# Patient Record
Sex: FEMALE | Race: WHITE | NOT HISPANIC OR LATINO | Employment: OTHER | URBAN - METROPOLITAN AREA
[De-identification: names, ages, dates, MRNs, and addresses within clinical notes are randomized per-mention and may not be internally consistent; named-entity substitution may affect disease eponyms.]

---

## 2021-05-10 ENCOUNTER — TRANSFERRED RECORDS (OUTPATIENT)
Dept: OTOLARYNGOLOGY | Facility: CLINIC | Age: 86
End: 2021-05-10

## 2022-07-18 ENCOUNTER — OFFICE VISIT (OUTPATIENT)
Dept: OPHTHALMOLOGY | Facility: CLINIC | Age: 87
End: 2022-07-18
Payer: MEDICARE

## 2022-07-18 DIAGNOSIS — H04.123 DRY EYE SYNDROME OF BOTH EYES: ICD-10-CM

## 2022-07-18 DIAGNOSIS — H52.13 MYOPIA OF BOTH EYES WITH ASTIGMATISM AND PRESBYOPIA: ICD-10-CM

## 2022-07-18 DIAGNOSIS — H02.831 DERMATOCHALASIS OF BOTH UPPER EYELIDS: ICD-10-CM

## 2022-07-18 DIAGNOSIS — R68.89 SUSPECTED GLAUCOMA OF BOTH EYES: ICD-10-CM

## 2022-07-18 DIAGNOSIS — H52.203 MYOPIA OF BOTH EYES WITH ASTIGMATISM AND PRESBYOPIA: ICD-10-CM

## 2022-07-18 DIAGNOSIS — H52.4 MYOPIA OF BOTH EYES WITH ASTIGMATISM AND PRESBYOPIA: ICD-10-CM

## 2022-07-18 DIAGNOSIS — Z96.1 PSEUDOPHAKIA OF BOTH EYES: ICD-10-CM

## 2022-07-18 DIAGNOSIS — H02.834 DERMATOCHALASIS OF BOTH UPPER EYELIDS: ICD-10-CM

## 2022-07-18 DIAGNOSIS — H35.61 RETINAL HEMORRHAGE OF RIGHT EYE: Primary | ICD-10-CM

## 2022-07-18 PROCEDURE — 92134 CPTRZ OPH DX IMG PST SGM RTA: CPT | Performed by: OPHTHALMOLOGY

## 2022-07-18 PROCEDURE — 99203 OFFICE O/P NEW LOW 30 MIN: CPT | Performed by: OPHTHALMOLOGY

## 2022-07-18 RX ORDER — OMEPRAZOLE 40 MG/1
40 CAPSULE, DELAYED RELEASE ORAL DAILY
COMMUNITY

## 2022-07-18 RX ORDER — ASPIRIN 81 MG/1
81 TABLET ORAL DAILY
COMMUNITY

## 2022-07-18 ASSESSMENT — CUP TO DISC RATIO
OD_RATIO: 0
OS_RATIO: 0.8

## 2022-07-18 ASSESSMENT — CONF VISUAL FIELD
OD_NORMAL: 1
OS_INFERIOR_TEMPORAL_RESTRICTION: 2
OS_SUPERIOR_TEMPORAL_RESTRICTION: 2
OS_SUPERIOR_NASAL_RESTRICTION: 2
OS_INFERIOR_NASAL_RESTRICTION: 2

## 2022-07-18 ASSESSMENT — REFRACTION_WEARINGRX
OS_AXIS: 078
OS_SPHERE: -2.25
OD_SPHERE: -0.50
OS_CYLINDER: +0.50
OD_AXIS: 043
OS_ADD: +3.00
OD_CYLINDER: +0.50
OD_ADD: +3.00

## 2022-07-18 ASSESSMENT — VISUAL ACUITY
OS_CC: 20/30
CORRECTION_TYPE: GLASSES
OD_CC: HM
OS_CC: J2
METHOD: SNELLEN - LINEAR

## 2022-07-18 ASSESSMENT — TONOMETRY
IOP_METHOD: APPLANATION
OS_IOP_MMHG: 14
OD_IOP_MMHG: 15

## 2022-07-18 ASSESSMENT — SLIT LAMP EXAM - LIDS
COMMENTS: 2+ DERMATOCHALASIS
COMMENTS: 2+ DERMATOCHALASIS

## 2022-07-18 NOTE — PROGRESS NOTES
HPI     Decreased Vision Evaluation     In right eye.  This started 8 months ago.  Presenting in central vision.  Charactertized as  blurred vision and fluctuating.  It is worse throughout the day.              Comments     She notes that about 6-8 months ago her right eye started getting blurry. It has continued to get worse. She states the left eye has double vision. This has been going on for many years. She has prism in her glasses. When she has her glasses on, there is no double vision. Her glasses are 1 year old.    She had cataract surgery on both eyes in 2015 with Dr. Carty.     denies family history of ocular conditions     JAYDEN 2 years ago          Last edited by Jaylen Gonzales MD on 7/18/2022 11:28 AM. (History)         Review of systems for the eyes was negative other than the pertinent positives/negatives listed in the HPI.      Assessment & Plan    HPI:  Elidia Sigala is a 89 year old female with history of brain stem CVA, vertigo, neuropathy, pseudophakia, myopia with astigmatism and presbyopia presents with 1 year of progressive vision loss in her right eye.     POHx: HERMANN Carty, binocular diplopia  PMHx:  Current Medications: aspirin 81 MG EC tablet, Take 81 mg by mouth daily  omeprazole (PRILOSEC) 40 MG DR capsule, Take 40 mg by mouth daily    No current facility-administered medications on file prior to visit.    PSHx:HERMANN Carty [facility] 2015      Current Eye Medications:  None    Assessment & Plan:  (H35.61) Retinal hemorrhage of right eye  (primary encounter diagnosis)  Preretinal and subretinal heme noted on exam  OCT demonstrates significant IRF and mild subretinal fluid  Differential includes Branch retinal vein occlusion, ruptured retinal arterial macroaneurysm, CNV   Will refer to retina for likely FA and anti-VEGF injection  No prior history of Diabetes mellitus or Age related macular degeneration, no drusen noted contralateral eye    (Z96.1) Pseudophakia of both  eyes  [facility] 2015 [facility]    (H52.13,  H52.203,  H52.4) Myopia of both eyes with astigmatism and presbyopia  Doing well in current MRx    (H04.123) Dry eye syndrome of both eyes  Start artificial tears four times daily (best used before reading, using a computer or watching TV)    Glaucoma Suspect  Needs CEE with and complete glaucoma workup at some point in the future    Diplopia  Resolves with prism  Continue prism glasses    Return for refer to retina for FA and likely Avastin-LEYLA vs BRVO vs CNV.        Jaylen Gonzales MD     Attending Physician Attestation:  Complete documentation of historical and exam elements from today's encounter can be found in the full encounter summary report (not reduplicated in this progress note).  I personally obtained the chief complaint(s) and history of present illness.  I confirmed and edited as necessary the review of systems, past medical/surgical history, family history, social history, and examination findings as documented by others; and I examined the patient myself.  I personally reviewed the relevant tests, images, and reports as documented above.  I formulated and edited as necessary the assessment and plan and discussed the findings and management plan with the patient and family. - Jaylen Gonzales MD

## 2022-07-18 NOTE — NURSING NOTE
"Chief Complaints and History of Present Illnesses   Patient presents with     Decreased Vision Evaluation       Chief Complaint(s) and History of Present Illness(es)     Decreased Vision Evaluation     Laterality: right eye    Onset: 8 months ago    Location: central vision    Quality: blurred vision and fluctuating    Timing: throughout the day              Comments     She notes that about 6-8 months ago her right eye started getting blurry. It has continued to get worse. She describes it as being\"wilmar\", like looking through a curtain. Vision keeps getting darker.   She states the left eye has double vision. This has been going on for many years. She has prism in her glasses. When she has her glasses on, there is no double vision. Her glasses are 1 year old.   She had cataract surgery on both eyes in 2015.                JIM Ellis  10:41 AM 07/18/2022    "

## 2022-07-19 ENCOUNTER — TELEPHONE (OUTPATIENT)
Dept: OPHTHALMOLOGY | Facility: CLINIC | Age: 87
End: 2022-07-19

## 2022-07-19 NOTE — TELEPHONE ENCOUNTER
Called Bhumika (daughter-in-law) to schedule an appt with a retina provider here at the  this week, per Dr. Gonzales's request.  Bhumika stated the pt's daughters changed their minds and were going to set up something elsewhere as they aren't comfortable driving in the [facility].    Marietta Kolb, COA 1:05 PM July 19, 2022

## 2022-08-01 ENCOUNTER — TRANSFERRED RECORDS (OUTPATIENT)
Dept: HEALTH INFORMATION MANAGEMENT | Facility: CLINIC | Age: 87
End: 2022-08-01

## 2023-04-10 ENCOUNTER — TRANSFERRED RECORDS (OUTPATIENT)
Dept: HEALTH INFORMATION MANAGEMENT | Facility: CLINIC | Age: 88
End: 2023-04-10
Payer: MEDICARE

## 2023-06-02 ENCOUNTER — OFFICE VISIT (OUTPATIENT)
Dept: OPHTHALMOLOGY | Facility: CLINIC | Age: 88
End: 2023-06-02
Payer: MEDICARE

## 2023-06-02 DIAGNOSIS — Z96.1 PSEUDOPHAKIA OF BOTH EYES: ICD-10-CM

## 2023-06-02 DIAGNOSIS — H52.4 MYOPIA OF BOTH EYES WITH ASTIGMATISM AND PRESBYOPIA: ICD-10-CM

## 2023-06-02 DIAGNOSIS — H52.13 MYOPIA OF BOTH EYES WITH ASTIGMATISM AND PRESBYOPIA: ICD-10-CM

## 2023-06-02 DIAGNOSIS — H35.30 ARMD (AGE-RELATED MACULAR DEGENERATION), BILATERAL: ICD-10-CM

## 2023-06-02 DIAGNOSIS — H52.203 MYOPIA OF BOTH EYES WITH ASTIGMATISM AND PRESBYOPIA: ICD-10-CM

## 2023-06-02 DIAGNOSIS — H04.123 DRY EYE SYNDROME OF BOTH EYES: ICD-10-CM

## 2023-06-02 DIAGNOSIS — H35.61 RETINAL HEMORRHAGE OF RIGHT EYE: Primary | ICD-10-CM

## 2023-06-02 PROCEDURE — 92014 COMPRE OPH EXAM EST PT 1/>: CPT | Performed by: OPHTHALMOLOGY

## 2023-06-02 PROCEDURE — 92015 DETERMINE REFRACTIVE STATE: CPT | Performed by: OPHTHALMOLOGY

## 2023-06-02 RX ORDER — CALCIUM CARBONATE/VITAMIN D3 600 MG-10
1000 TABLET ORAL DAILY
COMMUNITY

## 2023-06-02 RX ORDER — LEVOTHYROXINE SODIUM 75 UG/1
1 TABLET ORAL
COMMUNITY
Start: 2023-05-22

## 2023-06-02 RX ORDER — TORSEMIDE 5 MG/1
1 TABLET ORAL
COMMUNITY
Start: 2023-04-19

## 2023-06-02 ASSESSMENT — VISUAL ACUITY
METHOD: SNELLEN - LINEAR
OD_CC: CF
CORRECTION_TYPE: GLASSES
OS_CC: 20/25

## 2023-06-02 ASSESSMENT — REFRACTION_WEARINGRX
OD_ADD: +3.00
OD_SPHERE: -0.50
OS_AXIS: 078
OS_ADD: +3.00
OD_AXIS: 043
OS_VPRISM: 3BU
OD_HPRISM: 2BO
OD_CYLINDER: +0.50
OS_CYLINDER: +0.50
OS_SPHERE: -2.25
OD_VPRISM: 3BD
OS_HPRISM: 2BO

## 2023-06-02 ASSESSMENT — CUP TO DISC RATIO
OS_RATIO: 0.8
OD_RATIO: 0.8

## 2023-06-02 ASSESSMENT — REFRACTION_FINALRX
OS_HPRISM: 2BO
OS_VPRISM: 3BU
OD_HPRISM: 2BO
OS_HPRISM: 2BO
OS_VPRISM: 3BU
OD_HPRISM: 2BO
OD_VPRISM: 3BD
OD_VPRISM: 3BD

## 2023-06-02 ASSESSMENT — CONF VISUAL FIELD
OD_INFERIOR_TEMPORAL_RESTRICTION: 2
OD_SUPERIOR_NASAL_RESTRICTION: 2
OS_INFERIOR_NASAL_RESTRICTION: 0
OS_SUPERIOR_NASAL_RESTRICTION: 0
OD_SUPERIOR_TEMPORAL_RESTRICTION: 2
OS_INFERIOR_TEMPORAL_RESTRICTION: 0
OD_INFERIOR_NASAL_RESTRICTION: 2
OS_NORMAL: 1
OS_SUPERIOR_TEMPORAL_RESTRICTION: 0

## 2023-06-02 ASSESSMENT — TONOMETRY
OS_IOP_MMHG: 10
OD_IOP_MMHG: 14
IOP_METHOD: ICARE

## 2023-06-02 ASSESSMENT — SLIT LAMP EXAM - LIDS
COMMENTS: 2+ DERMATOCHALASIS
COMMENTS: 2+ DERMATOCHALASIS

## 2023-06-02 ASSESSMENT — REFRACTION_MANIFEST
OD_CYLINDER: +0.50
OS_ADD: +3.00
OS_SPHERE: -2.00
OS_AXIS: 078
OD_AXIS: 043
OS_CYLINDER: +0.50
OD_SPHERE: -0.50
OD_ADD: +3.00

## 2023-06-02 NOTE — NURSING NOTE
Chief Complaints and History of Present Illnesses   Patient presents with     Annual Eye Exam       Chief Complaint(s) and History of Present Illness(es)     Annual Eye Exam            Laterality: both eyes          Comments    Patient returning for annual eye exam.   Patient has been getting injections with Dr. Laird at [facility], last injection was 05/22/23 (Pt states at this appointment he changed up the injection to something different).  Patient feel she has lost some depth perception and feels that her vision is blurry. Is wondering if updating an RX is worth it or not.   No pain,No flashes, No floaters.   Right eye continues to have lacy appearance over it and distorted vision when her glasses are off.                  Kenneth Strauss, Ophthalmic Assistant    regular rate and rhythm

## 2023-06-02 NOTE — PROGRESS NOTES
HPI     Annual Eye Exam    In both eyes.           Comments    Patient returning for annual eye exam.   Patient has been getting injections with Dr. Laird at [facility], last injection was 05/22/23 (Pt states at this appointment he changed up the injection to something different).  Patient feel she has lost some depth perception and feels that her vision is blurry. Is wondering if updating an RX is worth it or not.   No pain,No flashes, No floaters.   Right eye continues to have lacy appearance over it and distorted vision when her glasses are off.           Last edited by Kenneth Strauss on 6/2/2023  2:47 PM.         Review of systems for the eyes was negative other than the pertinent positives/negatives listed in the HPI.      Assessment & Plan    HPI:  Elidia Sigala is a 89 year old female with history of brain stem CVA, vertigo, neuropathy, pseudophakia, myopia with astigmatism and presbyopia presents for annual exam. Had large heme right eye and has been receiving injections from [facility]Dr Alexey Laird. Received 8 Eylea injections as of 4/10/23    POHx: HERMANN Carty, binocular diplopia  PMHx:  Current Medications: aspirin 81 MG EC tablet, Take 81 mg by mouth daily  cholecalciferol 50 MCG (2000 UT) CAPS, Take 2,000 Units by mouth  levothyroxine (SYNTHROID/LEVOTHROID) 75 MCG tablet, Take 1 tablet by mouth daily at 2 pm  omeprazole (PRILOSEC) 40 MG DR capsule, Take 40 mg by mouth daily  torsemide (DEMADEX) 5 MG tablet, Take 1 tablet by mouth daily at 2 pm  vitamin B-12 (CYANOCOBALAMIN) 250 MCG tablet, Take 1,000 mcg by mouth daily    No current facility-administered medications on file prior to visit.    PSHx:HERMANN [facility] 2015      Current Eye Medications:  None    Assessment & Plan:  (H35.61) Retinal hemorrhage of right eye  (primary encounter diagnosis)  (H35.30) ARMD (age-related macular degeneration), bilateral  Preretinal and subretinal heme noted on exam  Branch retinal vein  occlusion vs Age related macular degeneration right eye, receiving injections from Alexey Laird  left eye with heme today  Encouraged AREDS2 and to see RCM sooner than 4 weeks from now to consider injections left eye     (Z96.1) Pseudophakia of both eyes  [facility] 2015 [facility]    (H52.13,  H52.203,  H52.4) Myopia of both eyes with astigmatism and presbyopia  Doing well in current MRx    (H04.123) Dry eye syndrome of both eyes  Start artificial tears four times daily (best used before reading, using a computer or watching TV)    Diplopia  20/200 vision right eye  Prism dispensed  Advised single vision readers and distance glasses  Biggest issue is diplopia with near, likely prismatic effect from bifocal and prism    Return in about 3 months (around 9/2/2023) for v/t glasses check.        Jaylen Gonzales MD     Attending Physician Attestation:  Complete documentation of historical and exam elements from today's encounter can be found in the full encounter summary report (not reduplicated in this progress note).  I personally obtained the chief complaint(s) and history of present illness.  I confirmed and edited as necessary the review of systems, past medical/surgical history, family history, social history, and examination findings as documented by others; and I examined the patient myself.  I personally reviewed the relevant tests, images, and reports as documented above.  I formulated and edited as necessary the assessment and plan and discussed the findings and management plan with the patient and family. - Jaylen Gonzales MD

## 2023-09-01 ENCOUNTER — OFFICE VISIT (OUTPATIENT)
Dept: OPHTHALMOLOGY | Facility: CLINIC | Age: 88
End: 2023-09-01
Payer: MEDICARE

## 2023-09-01 DIAGNOSIS — H53.2 DIPLOPIA: ICD-10-CM

## 2023-09-01 DIAGNOSIS — H52.203 MYOPIA OF BOTH EYES WITH ASTIGMATISM AND PRESBYOPIA: Primary | ICD-10-CM

## 2023-09-01 DIAGNOSIS — H52.4 MYOPIA OF BOTH EYES WITH ASTIGMATISM AND PRESBYOPIA: Primary | ICD-10-CM

## 2023-09-01 DIAGNOSIS — H52.13 MYOPIA OF BOTH EYES WITH ASTIGMATISM AND PRESBYOPIA: Primary | ICD-10-CM

## 2023-09-01 PROCEDURE — 99212 OFFICE O/P EST SF 10 MIN: CPT | Performed by: OPHTHALMOLOGY

## 2023-09-01 ASSESSMENT — CONF VISUAL FIELD
OD_INFERIOR_TEMPORAL_RESTRICTION: 2
OS_SUPERIOR_NASAL_RESTRICTION: 0
OD_SUPERIOR_NASAL_RESTRICTION: 2
OD_INFERIOR_NASAL_RESTRICTION: 2
OS_SUPERIOR_TEMPORAL_RESTRICTION: 0
OS_INFERIOR_NASAL_RESTRICTION: 0
OS_INFERIOR_TEMPORAL_RESTRICTION: 0
OD_SUPERIOR_TEMPORAL_RESTRICTION: 2
OS_NORMAL: 1

## 2023-09-01 ASSESSMENT — REFRACTION_WEARINGRX
OD_AXIS: 043
OD_CYLINDER: +0.50
OD_ADD: +3.00
OD_SPHERE: -0.50
OS_ADD: +3.00
OS_HPRISM: 2BO
OS_AXIS: 078
OD_HPRISM: 2BO
OS_CYLINDER: +0.50
OS_VPRISM: 3BU
OS_SPHERE: -2.25
OD_VPRISM: 3BD

## 2023-09-01 ASSESSMENT — REFRACTION_MANIFEST
OS_CYLINDER: +0.75
OS_AXIS: 078
OD_ADD: +3.00
OD_SPHERE: -0.50
OS_SPHERE: -2.00
OD_AXIS: 043
OS_ADD: +3.00
OD_CYLINDER: +0.50

## 2023-09-01 ASSESSMENT — VISUAL ACUITY
OS_CC: 20/20
CORRECTION_TYPE: GLASSES
OD_CC: CF
METHOD: SNELLEN - LINEAR
OS_CC+: -2

## 2023-09-01 ASSESSMENT — SLIT LAMP EXAM - LIDS
COMMENTS: 2+ DERMATOCHALASIS, LOWER LID LAXITY
COMMENTS: 2+ DERMATOCHALASIS, LOWER LID LAXITY

## 2023-09-01 ASSESSMENT — TONOMETRY
OS_IOP_MMHG: 11
IOP_METHOD: ICARE
OD_IOP_MMHG: 10

## 2023-09-01 NOTE — NURSING NOTE
"Chief Complaints and History of Present Illnesses   Patient presents with    Follow Up     Glasses Check.        Chief Complaint(s) and History of Present Illness(es)       Follow Up              Laterality: both eyes    Comments: Glasses Check.               Comments    Patient returning for 3 month follow up and RX check.   Patient is continuing to wear trifocal glasses.   She has more questions about the recommendation to switch to SV lenses and alternate distance and near, feels this will not be practical.  Questions about switching to progressive lenses or bifocal instead of current trifocal.   Had retinal injection on 08/21/23 w/ Dr. Laird. Getting more \"crazy lines\" in her vision instead of the lace pattern previously.   No eye drops.                              Kenneth Strauss, Ophthalmic Assistant    "

## 2023-09-01 NOTE — PROGRESS NOTES
"HPI       Follow Up    In both eyes. Additional comments: Glasses Check.              Comments    Patient returning for 3 month follow up and RX check.   Patient is continuing to wear trifocal glasses.   She has more questions about the recommendation to switch to SV lenses and alternate distance and near, feels this will not be practical.  Questions about switching to progressive lenses or bifocal instead of current trifocal.   Had retinal injection on 08/21/23 w/ Dr. Laird. Getting more \"crazy lines\" in her vision instead of the lace pattern previously.   No eye drops.                     Last edited by Kenneth Strauss on 9/1/2023  1:18 PM.         Review of systems for the eyes was negative other than the pertinent positives/negatives listed in the HPI.      Assessment & Plan    HPI:  Elidia Sigala is a 89 year old female with history of brain stem CVA, vertigo, neuropathy, pseudophakia, myopia with astigmatism and presbyopia presents for followup glasses check. She did not get new glasses since her last visit.       POHx: HERMANN Carty, binocular diplopia  PMHx: hypothyroid, GERD  Current Medications: aspirin 81 MG EC tablet, Take 81 mg by mouth daily  cholecalciferol 50 MCG (2000 UT) CAPS, Take 2,000 Units by mouth  levothyroxine (SYNTHROID/LEVOTHROID) 75 MCG tablet, Take 1 tablet by mouth daily at 2 pm  omeprazole (PRILOSEC) 40 MG DR capsule, Take 40 mg by mouth daily  torsemide (DEMADEX) 5 MG tablet, Take 1 tablet by mouth daily at 2 pm  vitamin B-12 (CYANOCOBALAMIN) 250 MCG tablet, Take 1,000 mcg by mouth daily    No current facility-administered medications on file prior to visit.    PSHx:HERMANN [facility]      Current Eye Medications:  None    Assessment & Plan:  (H52.13,  H52.203,  H52.4) Myopia of both eyes with astigmatism and presbyopia  Diplopia  20/200 vision right eye  Prism dispensed (previously used x 20 years)  Advised to fill MRx for monocular precautions  Return at any time " for to check for diplopia or prism change      (H35.61) Retinal hemorrhage of right eye  (primary encounter diagnosis)  (H35.30) ARMD (age-related macular degeneration), bilateral  Preretinal and subretinal heme noted on exam  Branch retinal vein occlusion vs Age related macular degeneration right eye, receiving injections from Alexey Laird  left eye with ?heme previously, not confirmed by retina, no injections left eye     (Z96.1) Pseudophakia of both eyes  [facility] 2015 [facility]    (H04.123) Dry eye syndrome of both eyes  artificial tears four times daily (best used before reading, using a computer or watching TV)      Return in about 1 year (around 9/1/2024) for Annual Visit-v/t/d/MRx.        Jaylen Gonzales MD     Attending Physician Attestation:  Complete documentation of historical and exam elements from today's encounter can be found in the full encounter summary report (not reduplicated in this progress note).  I personally obtained the chief complaint(s) and history of present illness.  I confirmed and edited as necessary the review of systems, past medical/surgical history, family history, social history, and examination findings as documented by others; and I examined the patient myself.  I personally reviewed the relevant tests, images, and reports as documented above.  I formulated and edited as necessary the assessment and plan and discussed the findings and management plan with the patient and family. - Jaylen Gonzales MD

## 2023-09-05 ENCOUNTER — TELEPHONE (OUTPATIENT)
Dept: OPHTHALMOLOGY | Facility: CLINIC | Age: 88
End: 2023-09-05
Payer: MEDICARE

## 2023-09-05 NOTE — TELEPHONE ENCOUNTER
Left Voicemail (1st Attempt) for the patient to call back and schedule the following:    Appointment type: Return  Provider: Dr. Gonzales  Return date: 9/1/2024  Specialty phone number: [phone]  Additonal Notes: Return in about 1 year (around 9/1/2024) for Annual Visit-v/t/d/Jose renteria Procedure   Dermatology, Surgery, Urology  [facility]

## 2023-09-25 ENCOUNTER — TRANSFERRED RECORDS (OUTPATIENT)
Dept: HEALTH INFORMATION MANAGEMENT | Facility: CLINIC | Age: 88
End: 2023-09-25
Payer: MEDICARE

## 2023-11-06 ENCOUNTER — TRANSFERRED RECORDS (OUTPATIENT)
Dept: HEALTH INFORMATION MANAGEMENT | Facility: CLINIC | Age: 88
End: 2023-11-06
Payer: MEDICARE

## 2023-12-18 ENCOUNTER — TRANSFERRED RECORDS (OUTPATIENT)
Dept: HEALTH INFORMATION MANAGEMENT | Facility: CLINIC | Age: 88
End: 2023-12-18
Payer: MEDICARE

## 2024-01-29 ENCOUNTER — TRANSFERRED RECORDS (OUTPATIENT)
Dept: HEALTH INFORMATION MANAGEMENT | Facility: CLINIC | Age: 89
End: 2024-01-29
Payer: MEDICARE

## 2024-03-05 ENCOUNTER — TELEPHONE (OUTPATIENT)
Dept: OPHTHALMOLOGY | Facility: CLINIC | Age: 89
End: 2024-03-05
Payer: MEDICARE

## 2024-03-05 NOTE — TELEPHONE ENCOUNTER
Left Voicemail (2nd Attempt) for the patient to call back and schedule the following:    Appointment type: return  Provider: dr. Gonzales   Return date: 9/1/2024  Specialty phone number: [phone]   Additonal Notes: : Return in about 1 year (around 9/1/2024) for Annual Visit-v/t/d/Jose renteria Complex   Orthopedics, Podiatry, Sports Medicine, Ent ,Eye , Audiology, Adult Endocrine & Diabetes, Nutrition & Medication Therapy Management Specialties   [facility]

## 2024-04-08 ENCOUNTER — TRANSFERRED RECORDS (OUTPATIENT)
Dept: HEALTH INFORMATION MANAGEMENT | Facility: CLINIC | Age: 89
End: 2024-04-08
Payer: MEDICARE

## 2024-07-15 ENCOUNTER — TRANSFERRED RECORDS (OUTPATIENT)
Dept: HEALTH INFORMATION MANAGEMENT | Facility: CLINIC | Age: 89
End: 2024-07-15
Payer: MEDICARE

## 2024-08-19 ENCOUNTER — TRANSFERRED RECORDS (OUTPATIENT)
Dept: HEALTH INFORMATION MANAGEMENT | Facility: CLINIC | Age: 89
End: 2024-08-19
Payer: MEDICARE

## 2024-09-09 ENCOUNTER — OFFICE VISIT (OUTPATIENT)
Dept: OPHTHALMOLOGY | Facility: CLINIC | Age: 89
End: 2024-09-09
Payer: MEDICARE

## 2024-09-09 DIAGNOSIS — H40.003 GLAUCOMA SUSPECT OF BOTH EYES: Primary | ICD-10-CM

## 2024-09-09 DIAGNOSIS — H04.123 DRY EYE SYNDROME OF BOTH EYES: ICD-10-CM

## 2024-09-09 DIAGNOSIS — H35.61 RETINAL HEMORRHAGE OF RIGHT EYE: ICD-10-CM

## 2024-09-09 DIAGNOSIS — H52.4 MYOPIA OF BOTH EYES WITH ASTIGMATISM AND PRESBYOPIA: ICD-10-CM

## 2024-09-09 DIAGNOSIS — Z96.1 PSEUDOPHAKIA OF BOTH EYES: ICD-10-CM

## 2024-09-09 DIAGNOSIS — H52.203 MYOPIA OF BOTH EYES WITH ASTIGMATISM AND PRESBYOPIA: ICD-10-CM

## 2024-09-09 DIAGNOSIS — H52.13 MYOPIA OF BOTH EYES WITH ASTIGMATISM AND PRESBYOPIA: ICD-10-CM

## 2024-09-09 DIAGNOSIS — H35.30 ARMD (AGE-RELATED MACULAR DEGENERATION), BILATERAL: ICD-10-CM

## 2024-09-09 PROCEDURE — 92015 DETERMINE REFRACTIVE STATE: CPT | Performed by: OPHTHALMOLOGY

## 2024-09-09 PROCEDURE — 92014 COMPRE OPH EXAM EST PT 1/>: CPT | Performed by: OPHTHALMOLOGY

## 2024-09-09 PROCEDURE — 92133 CPTRZD OPH DX IMG PST SGM ON: CPT | Performed by: OPHTHALMOLOGY

## 2024-09-09 ASSESSMENT — REFRACTION_WEARINGRX
OD_SPHERE: -0.50
OD_CYLINDER: +0.50
OS_AXIS: 079
OS_CYLINDER: +1.00
OS_ADD: +3.25
OD_AXIS: 046
OD_ADD: +3.25
OS_SPHERE: -2.50

## 2024-09-09 ASSESSMENT — REFRACTION_MANIFEST
OS_ADD: +3.25
OD_ADD: +3.25
OD_CYLINDER: +0.50
OS_CYLINDER: +0.75
OD_AXIS: 043
OS_SPHERE: -2.00
OD_SPHERE: -0.50
OS_AXIS: 104

## 2024-09-09 ASSESSMENT — TONOMETRY
OD_IOP_MMHG: 13
OS_IOP_MMHG: 16
IOP_METHOD: ICARE

## 2024-09-09 ASSESSMENT — CUP TO DISC RATIO
OD_RATIO: 0.8
OS_RATIO: 0.8

## 2024-09-09 ASSESSMENT — CONF VISUAL FIELD
OS_INFERIOR_TEMPORAL_RESTRICTION: 0
OS_SUPERIOR_TEMPORAL_RESTRICTION: 0
OD_SUPERIOR_NASAL_RESTRICTION: 2
OD_SUPERIOR_TEMPORAL_RESTRICTION: 2
OS_SUPERIOR_NASAL_RESTRICTION: 0
OS_INFERIOR_NASAL_RESTRICTION: 0
OD_INFERIOR_TEMPORAL_RESTRICTION: 2
OS_NORMAL: 1
METHOD: COUNTING FINGERS
OD_INFERIOR_NASAL_RESTRICTION: 2

## 2024-09-09 ASSESSMENT — SLIT LAMP EXAM - LIDS
COMMENTS: 2+ DERMATOCHALASIS, LOWER LID LAXITY
COMMENTS: 2+ DERMATOCHALASIS, LOWER LID LAXITY

## 2024-09-09 ASSESSMENT — VISUAL ACUITY
METHOD: SNELLEN - LINEAR
OS_CC+: +3
OD_CC: 20/400
OS_CC: 20/25

## 2024-09-09 NOTE — NURSING NOTE
Chief Complaints and History of Present Illnesses   Patient presents with    Annual Eye Exam       Chief Complaint(s) and History of Present Illness(es)       Annual Eye Exam              Laterality: both eyes              Comments    Patient has noticed a decrease in her vision, especially at near. She continues to get anti-veg F injections in her right eye. Her last one was August 19th, 2024. She also mentions an increase in floaters that come and go. No flashing lights. No ocular pain or discomfort.   Ocular Medications: Ocuvite by mouth two tabs once a day                   Jayna Vincent, COA

## 2024-09-09 NOTE — PROGRESS NOTES
HPI       Annual Eye Exam    In both eyes.             Comments    Patient has noticed a decrease in her vision, especially at near. She continues to get anti-veg F injections in her right eye. Her last one was August 19th, 2024. She also mentions an increase in floaters that come and go. No flashing lights. No ocular pain or discomfort.   Ocular Medications: Ocuvite by mouth two tabs once a day          Last edited by Jayna Vincent on 9/9/2024  1:29 PM.         Review of systems for the eyes was negative other than the pertinent positives/negatives listed in the HPI.      Assessment & Plan    HPI:  Elidia Sigala is a 89 year old female with history of brain stem CVA, vertigo, neuropathy, pseudophakia, Age related macular degeneration, myopia with astigmatism and presbyopia presents for annual exam. Continues to see Dr. Alexey Laird at [facility] for injections right eye q4-5weeks      POHx: HERMANN Carty, binocular diplopia  PMHx: hypothyroid, GERD  Current Medications:   Current Outpatient Medications   Medication Sig Dispense Refill    aspirin 81 MG EC tablet Take 81 mg by mouth daily      cholecalciferol 50 MCG (2000 UT) CAPS Take 2,000 Units by mouth      levothyroxine (SYNTHROID/LEVOTHROID) 75 MCG tablet Take 1 tablet by mouth daily at 2 pm      omeprazole (PRILOSEC) 40 MG DR capsule Take 40 mg by mouth daily      torsemide (DEMADEX) 5 MG tablet Take 1 tablet by mouth daily at 2 pm      vitamin B-12 (CYANOCOBALAMIN) 250 MCG tablet Take 1,000 mcg by mouth daily       No current facility-administered medications for this visit.     PSHx:HERMANN [facility]      Current Eye Medications:  None    Assessment & Plan:  (H52.13,  H52.203,  H52.4) Myopia of both eyes with astigmatism and presbyopia  Diplopia  20/200 vision right eye  Prism dispensed (previously used x 20 years)  Patient has minimal change in myopia but a copy of today's glasses prescription was given.  The patient may wish to update the glasses  if the lenses are scratched or the frames are too small.  Presbyopia is difficulty seeing up close and is treated with bifocals or over the counter reading glasses    (H40.003) Glaucoma suspect of both eyes  (primary encounter diagnosis)  OCT nerve fiber layer 09/09/24:   Right eye - G(r) 72 NI (y) 59 TI (r) 73 NS (y) 69 TS (r) 88      Left eye - G(r) 69 NI (g) 74 TI (g) 119 NS (r) 39 TS (y) 95    IOP within normal limits  Observe off drops  Repeat oct retinal nerve fiber layer 6 months    (H35.30) ARMD (age-related macular degeneration), bilateral  Preretinal and subretinal heme noted on exam  Branch retinal vein occlusion vs Age related macular degeneration right eye, receiving injections from Alexey Laird  left eye with heme superiorly      (Z96.1) Pseudophakia of both eyes  [place]reska 2015 [facility]    (H04.123) Dry eye syndrome of both eyes  artificial tears four times daily (best used before reading, using a computer or watching TV)      Return in about 6 months (around 3/9/2025) for Follow Up-v/t, OCT RNFL.        Jaylen Gonzales MD     Attending Physician Attestation:  Complete documentation of historical and exam elements from today's encounter can be found in the full encounter summary report (not reduplicated in this progress note).  I personally obtained the chief complaint(s) and history of present illness.  I confirmed and edited as necessary the review of systems, past medical/surgical history, family history, social history, and examination findings as documented by others; and I examined the patient myself.  I personally reviewed the relevant tests, images, and reports as documented above.  I formulated and edited as necessary the assessment and plan and discussed the findings and management plan with the patient and family. - Jaylen Gonzales MD

## 2024-12-02 ENCOUNTER — TRANSFERRED RECORDS (OUTPATIENT)
Dept: HEALTH INFORMATION MANAGEMENT | Facility: CLINIC | Age: 89
End: 2024-12-02
Payer: MEDICARE

## 2025-03-31 ENCOUNTER — OFFICE VISIT (OUTPATIENT)
Dept: OPHTHALMOLOGY | Facility: CLINIC | Age: OVER 89
End: 2025-03-31
Payer: MEDICARE

## 2025-03-31 DIAGNOSIS — H35.30 ARMD (AGE-RELATED MACULAR DEGENERATION), BILATERAL: ICD-10-CM

## 2025-03-31 DIAGNOSIS — H52.13 MYOPIA OF BOTH EYES WITH ASTIGMATISM AND PRESBYOPIA: ICD-10-CM

## 2025-03-31 DIAGNOSIS — H53.2 DIPLOPIA: ICD-10-CM

## 2025-03-31 DIAGNOSIS — H35.61 RETINAL HEMORRHAGE OF RIGHT EYE: ICD-10-CM

## 2025-03-31 DIAGNOSIS — H40.003 GLAUCOMA SUSPECT OF BOTH EYES: Primary | ICD-10-CM

## 2025-03-31 DIAGNOSIS — H04.123 DRY EYE SYNDROME OF BOTH EYES: ICD-10-CM

## 2025-03-31 DIAGNOSIS — H52.203 MYOPIA OF BOTH EYES WITH ASTIGMATISM AND PRESBYOPIA: ICD-10-CM

## 2025-03-31 DIAGNOSIS — H52.4 MYOPIA OF BOTH EYES WITH ASTIGMATISM AND PRESBYOPIA: ICD-10-CM

## 2025-03-31 DIAGNOSIS — Z96.1 PSEUDOPHAKIA OF BOTH EYES: ICD-10-CM

## 2025-03-31 PROCEDURE — 99213 OFFICE O/P EST LOW 20 MIN: CPT | Performed by: OPHTHALMOLOGY

## 2025-03-31 PROCEDURE — 92133 CPTRZD OPH DX IMG PST SGM ON: CPT | Performed by: OPHTHALMOLOGY

## 2025-03-31 PROCEDURE — 92015 DETERMINE REFRACTIVE STATE: CPT | Performed by: OPHTHALMOLOGY

## 2025-03-31 ASSESSMENT — REFRACTION_FINALRX: OS_VPRISM: 3.5BU

## 2025-03-31 ASSESSMENT — REFRACTION_WEARINGRX
OD_CYLINDER: +0.50
OS_SPHERE: -2.50
OS_ADD: +3.25
OD_AXIS: 046
OD_SPHERE: -0.50
OS_AXIS: 079
OS_CYLINDER: +1.00
OD_ADD: +3.25

## 2025-03-31 ASSESSMENT — VISUAL ACUITY
METHOD: SNELLEN - LINEAR
OD_CC: 10/200E
OS_CC+: -2
OS_CC: 20/30

## 2025-03-31 ASSESSMENT — CUP TO DISC RATIO
OS_RATIO: 0.8
OD_RATIO: 0.8

## 2025-03-31 ASSESSMENT — TONOMETRY
OS_IOP_MMHG: 14
OD_IOP_MMHG: 13
IOP_METHOD: TONOPEN

## 2025-03-31 ASSESSMENT — SLIT LAMP EXAM - LIDS
COMMENTS: 2+ DERMATOCHALASIS, LOWER LID LAXITY
COMMENTS: 2+ DERMATOCHALASIS, LOWER LID LAXITY

## 2025-03-31 NOTE — PROGRESS NOTES
HPI       Follow Up    In both eyes.  Since onset it is gradually worsening.  Associated symptoms include floaters.  Negative for eye pain and flashes.  Treatments tried include no treatments.             Comments    Here for follow up. VA has decreased. Stable floaters without flashes. No eye pain.    LOLA Kirkland 1:37 PM March 31, 2025             Last edited by Rocael Espinosa COMT on 3/31/2025  1:37 PM.         Review of systems for the eyes was negative other than the pertinent positives/negatives listed in the HPI.      Assessment & Plan    HPI:  Elidia Sigala is a 89 year old female with history of brain stem CVA, vertigo, neuropathy, pseudophakia, Age related macular degeneration, myopia with astigmatism and presbyopia presents for glaucoma suspect followup. Continues to see Dr. Alexey Laird at [facility] for injections right eye q4-5weeks      POHx: HERMANN Carty, binocular diplopia  PMHx: hypothyroid, GERD  Current Medications:   Current Outpatient Medications   Medication Sig Dispense Refill    aspirin 81 MG EC tablet Take 81 mg by mouth daily      cholecalciferol 50 MCG (2000 UT) CAPS Take 2,000 Units by mouth      levothyroxine (SYNTHROID/LEVOTHROID) 75 MCG tablet Take 1 tablet by mouth daily at 2 pm      omeprazole (PRILOSEC) 40 MG DR capsule Take 40 mg by mouth daily      torsemide (DEMADEX) 5 MG tablet Take 1 tablet by mouth daily at 2 pm      vitamin B-12 (CYANOCOBALAMIN) 250 MCG tablet Take 1,000 mcg by mouth daily       No current facility-administered medications for this visit.     PSHx:HERMANN [facility]      Current Eye Medications:  None    Assessment & Plan:  (H52.13,  H52.203,  H52.4) Myopia of both eyes with astigmatism and presbyopia  Diplopia  20/200 vision right eye  Prism dispensed (previously used x 20 years)  Reprint MRx from last visit    (H40.003) Glaucoma suspect of both eyes  (primary encounter diagnosis)  OCT nerve fiber layer 03/31/25 :   Right eye - G(r) 69 NI (y) 53 TI  (r) 67 NS (y) 68 TS (r) 87      Left eye - G(r) 68 NI (g) 76 TI (g) 120 NS (r) 36 TS (y) 96  OCT nerve fiber layer 09/09/24:   Right eye - G(r) 72 NI (y) 59 TI (r) 73 NS (y) 69 TS (r) 88      Left eye - G(r) 69 NI (g) 74 TI (g) 119 NS (r) 39 TS (y) 95    IOP within normal limits  Observe off drops  Repeat oct retinal nerve fiber layer 6 months    (H35.30) ARMD (age-related macular degeneration), bilateral  Preretinal and subretinal heme noted on exam  Branch retinal vein occlusion vs Age related macular degeneration right eye, receiving injections from Alexey Laird q 5weeks      (Z96.1) Pseudophakia of both eyes  [place] 2015 [facility]    (H04.123) Dry eye syndrome of both eyes  artificial tears four times daily (best used before reading, using a computer or watching TV)      Return in about 6 months (around 9/30/2025) for Annual Visit-v/t/d/MRx, 24-2 VF, OCT RNFL, OCT Macula.        Jaylen Gonzales MD     Attending Physician Attestation:  Complete documentation of historical and exam elements from today's encounter can be found in the full encounter summary report (not reduplicated in this progress note).  I personally obtained the chief complaint(s) and history of present illness.  I confirmed and edited as necessary the review of systems, past medical/surgical history, family history, social history, and examination findings as documented by others; and I examined the patient myself.  I personally reviewed the relevant tests, images, and reports as documented above.  I formulated and edited as necessary the assessment and plan and discussed the findings and management plan with the patient and family. - Jaylen Gonzales MD

## 2025-07-07 ENCOUNTER — TRANSFERRED RECORDS (OUTPATIENT)
Dept: HEALTH INFORMATION MANAGEMENT | Facility: CLINIC | Age: OVER 89
End: 2025-07-07
Payer: MEDICARE